# Patient Record
Sex: FEMALE
[De-identification: names, ages, dates, MRNs, and addresses within clinical notes are randomized per-mention and may not be internally consistent; named-entity substitution may affect disease eponyms.]

---

## 2024-01-31 PROBLEM — Z00.00 ENCOUNTER FOR PREVENTIVE HEALTH EXAMINATION: Status: ACTIVE | Noted: 2024-01-31

## 2024-02-01 ENCOUNTER — APPOINTMENT (OUTPATIENT)
Dept: ORTHOPEDIC SURGERY | Facility: CLINIC | Age: 34
End: 2024-02-01
Payer: OTHER GOVERNMENT

## 2024-02-01 VITALS
WEIGHT: 168 LBS | HEART RATE: 67 BPM | DIASTOLIC BLOOD PRESSURE: 62 MMHG | SYSTOLIC BLOOD PRESSURE: 103 MMHG | HEIGHT: 64 IN | BODY MASS INDEX: 28.68 KG/M2

## 2024-02-01 DIAGNOSIS — Z78.9 OTHER SPECIFIED HEALTH STATUS: ICD-10-CM

## 2024-02-01 DIAGNOSIS — M79.602 PAIN IN LEFT ARM: ICD-10-CM

## 2024-02-01 PROCEDURE — 99203 OFFICE O/P NEW LOW 30 MIN: CPT

## 2024-02-01 PROCEDURE — 73030 X-RAY EXAM OF SHOULDER: CPT | Mod: LT

## 2024-02-01 NOTE — REASON FOR VISIT
[Initial Visit] : an initial visit for [[employer] ] : provided by [employer]   [Time Spent: ____ minutes] : Total time spent using  services: [unfilled] minutes. The patient's primary language is not English thus required  services. [FreeTextEntry2] : Left shoulder pain HD[R]. Left shoulder surgery DOS: 02/01/2022 [Interpreters_IDNumber] : [id number] [Interpreters_FullName] : Lizandro [TWNoteComboBox1] : [place]

## 2024-02-01 NOTE — PHYSICAL EXAM
[de-identified] : General: Awake, alert, no acute distress, Patient was cooperative and appropriate during the examination.  The patient is of normal weight for height and age.  Walks without an antalgic gait.   Left shoulder Exam: Physical exam of the shoulder demonstrates well-healed arthroscopic shoulder incisions.  There is no erythema, warmth, or signs of infection.  Patient does have some soft tissue swelling as well as ecchymosis about the lateral aspect of the arm distal to the deltoid tuberosity.  No discrete masses palpated but the patient indicates the area on the anterolateral arm which she states changes in size.  Sensation intact light touch C5-T1 Palpable radial pulse Radial/ulnar/median/axillary/musculocutaneous/AIN/PIN nerves grossly intact  Range of motion: Forward Flexion: 180 Abduction: 150 External Rotation: 60 Internal Rotation: T7  Palpation: Not tender to palpation over the glenohumeral joint Not tender palpation over the rotator cuff insertion on the greater tuberosity Not tender to palpation over the AC joint Not tender to palpation of the biceps tendon/bicipital groove  Strength testing: Supraspinatus: 5/5 Infraspinatus: 5/5 Subscapularis: 5/5  Special test: Empty can test negative  Sebastian impingement test positive Speeds test negative [place]'s test negative Lift-off test negative Bell-press test negative Cross-arm adduction test negative  [de-identified] : X-rays not available views of the left shoulder obtained in the office on 3/1/2024 and reviewed imaging.  There is no acute fracture or dislocation.  There is no significant arthritis.

## 2024-02-01 NOTE — DISCUSSION/SUMMARY
[de-identified] : Assessment: 33-year-old female with left arm pain/swelling status post shoulder arthroscopy in 2022  Plan: I had a long discussion with the patient today regarding the nature of their diagnosis and treatment plan. We discussed the risks and benefits of no treatment as well as nonoperative and operative treatments.  I reviewed the patient's x-rays today with her in the office which are negative for any acute pathology.  On examination in the office she does have some ecchymosis and soft tissue swelling about the anterolateral left arm.  She indicates that this area does fluctuate in size from time to time and today is quite small.  When she massages it and this tends to help reduce the swelling but then she gets bruising like she has today.  I explained to the patient that it is unclear to me if this is related to her injury and/or surgery at this point.  I recommend an MRI of her left upper extremity to exclude the possibility of a mass or cyst.  She will continue to manage her self symptomatically on her own at home.  Recommend follow-up after the MRI to discuss results in person and any further recommendations.  The patient verbalizes their understanding and agrees with the plan.  All questions were answered to their satisfaction.  This visit was performed using a .
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
## 2024-02-15 ENCOUNTER — APPOINTMENT (OUTPATIENT)
Dept: MRI IMAGING | Facility: CLINIC | Age: 34
End: 2024-02-15
Payer: OTHER GOVERNMENT

## 2024-02-15 ENCOUNTER — OUTPATIENT (OUTPATIENT)
Dept: OUTPATIENT SERVICES | Facility: HOSPITAL | Age: 34
LOS: 1 days | End: 2024-02-15
Payer: OTHER GOVERNMENT

## 2024-02-15 DIAGNOSIS — M79.602 PAIN IN LEFT ARM: ICD-10-CM

## 2024-02-15 PROCEDURE — 73218 MRI UPPER EXTREMITY W/O DYE: CPT | Mod: 26,LT

## 2024-02-15 PROCEDURE — 73218 MRI UPPER EXTREMITY W/O DYE: CPT

## 2024-02-18 ENCOUNTER — TRANSCRIPTION ENCOUNTER (OUTPATIENT)
Age: 34
End: 2024-02-18